# Patient Record
Sex: FEMALE | Race: WHITE | NOT HISPANIC OR LATINO
[De-identification: names, ages, dates, MRNs, and addresses within clinical notes are randomized per-mention and may not be internally consistent; named-entity substitution may affect disease eponyms.]

---

## 2023-03-08 PROBLEM — Z00.00 ENCOUNTER FOR PREVENTIVE HEALTH EXAMINATION: Status: ACTIVE | Noted: 2023-03-08

## 2023-03-22 ENCOUNTER — NON-APPOINTMENT (OUTPATIENT)
Age: 53
End: 2023-03-22

## 2023-03-22 ENCOUNTER — APPOINTMENT (OUTPATIENT)
Dept: BREAST CENTER | Facility: CLINIC | Age: 53
End: 2023-03-22
Payer: COMMERCIAL

## 2023-03-22 VITALS
SYSTOLIC BLOOD PRESSURE: 113 MMHG | HEIGHT: 61 IN | DIASTOLIC BLOOD PRESSURE: 69 MMHG | HEART RATE: 81 BPM | WEIGHT: 120 LBS | BODY MASS INDEX: 22.66 KG/M2

## 2023-03-22 DIAGNOSIS — Z78.9 OTHER SPECIFIED HEALTH STATUS: ICD-10-CM

## 2023-03-22 DIAGNOSIS — Z80.1 FAMILY HISTORY OF MALIGNANT NEOPLASM OF TRACHEA, BRONCHUS AND LUNG: ICD-10-CM

## 2023-03-22 DIAGNOSIS — J45.909 UNSPECIFIED ASTHMA, UNCOMPLICATED: ICD-10-CM

## 2023-03-22 PROCEDURE — 99203 OFFICE O/P NEW LOW 30 MIN: CPT

## 2023-03-24 PROBLEM — Z78.9 NO FAMILY HISTORY OF OVARIAN CANCER: Status: ACTIVE | Noted: 2023-03-22

## 2023-03-24 PROBLEM — Z78.9 NO FAMILY HISTORY OF CANCER: Status: ACTIVE | Noted: 2023-03-22

## 2023-03-24 NOTE — HISTORY OF PRESENT ILLNESS
[FreeTextEntry1] : This is a 52 year old female referred by Dr. Raygoza for recent diagnosis of PASH. Patient underwent a USGBx of the left breast at [facility] on 2/15/23. Biopsy of the 4:00 nodule (8mm)  showed fibroadenomatous change and dense stromal fibrosis with focal PASH. Biopsy was felt to be concordant. \par Patient reports biopsies in the same area (left lower breast) at [facility] in 2010 and 2011 pathology was benign.\par \par She has no breast complaints today.\par \par She doesn't usually perform SBE.\par She has not noticed a change in her breast or a breast lump. except post bx changes\par She has not noticed a change in her nipple or nipple area.\par She has not noticed a change in the skin of the breast.\par She is not experiencing nipple discharge.\par She is not experiencing breast pain.\par She has not noticed a lump or lymph node under the armpit.\par \par \par BREAST CANCER RISK FACTORS\par Menarche: 14 \par Date of LMP: 2/18/2023\par Menopause: pre \par Grav:   4   Para: 3 (16, 14, 10)\par Age at first live birth: 2004\par Nursed: Y \par Hysterectomy: N \par Oophorectomy: N \par OCP: Y about 10 years \par HRT: N \par Last pap/pelvic exam: 2020 WNL \par Related family history: none\par Ashkenazi: N\par Mastery risk assessment: BRCAPRO 12.6% TCv7 24.4% TCv8 23.3% Sofia 17.7% Edvin n/a\par BRCA testing: N\par Bra size: 36B\par \par Last mammogram:  2/9/23                            Location: [facility]\par Report reviewed.                                 Images reviewed.\par Results: \par No dominant mass, suspicious microcalcifications or architectural distortion identified.\par \par Last ultrasound:   2/9/23                                Location:\par Report reviewed.                                 Images reviewed. \par Results: BIRADS 4\par Left breast: Developing nodule at 4:00, USGBx and post procedure MG recommended.\par Right breast: Stable with no suspicious findings. Biopsy should be considered.\par \par Last MRI: never                                            Location:\par Report reviewed.\par

## 2023-03-24 NOTE — ASSESSMENT
[FreeTextEntry1] : 51 yo female with high risk\par reviewed her risk status\par We reviewed risk reduction strategies including maintaining a BMI <25, limiting red meat intake and alcoholic beverages to 3 per week and exercise (150 min/ week low intensity or 75 min/week high intensity). And maintaining a normal vitamin D level.\par reviewed pathology, copy given\par I do not recommend excision at this time\par plan left 4N4 follow up ultrasound AUG 2023\par discussed MRI, she is amenable to this, reviewed risk of false positives\par plan MRI asap\par plan mg/sono 2/2024\par f/u 6 months if imaging is negative\par She knows to call or return sooner should any concerns or questions arise.\par \par

## 2023-03-24 NOTE — PHYSICAL EXAM
[Normocephalic] : normocephalic [Atraumatic] : atraumatic [Supple] : supple [No Supraclavicular Adenopathy] : no supraclavicular adenopathy [Examined in the supine and seated position] : examined in the supine and seated position [Symmetrical] : symmetrical [No dominant masses] : no dominant masses in right breast  [No dominant masses] : no dominant masses left breast [No Nipple Retraction] : no left nipple retraction [No Nipple Discharge] : no left nipple discharge [No Axillary Lymphadenopathy] : no left axillary lymphadenopathy [No Edema] : no edema [No Rashes] : no rashes [No Ulceration] : no ulceration [de-identified] : defect in LOQ in area of 3 biopsies

## 2023-03-24 NOTE — CONSULT LETTER
[Dear  ___] : Dear  [unfilled], [( Thank you for referring [unfilled] for consultation for _____ )] : Thank you for referring [unfilled] for consultation for [unfilled] [Please see my note below.] : Please see my note below. [Consult Closing:] : Thank you very much for allowing me to participate in the care of this patient.  If you have any questions, please do not hesitate to contact me. [Sincerely,] : Sincerely, [DrRobby  ___] : Dr. YOUSSEF [FreeTextEntry3] : Dolores Rivers MS DO\par Breast Surgeon\par [facility] \[place] [address]\par

## 2023-04-04 ENCOUNTER — NON-APPOINTMENT (OUTPATIENT)
Age: 53
End: 2023-04-04

## 2023-05-17 DIAGNOSIS — F41.9 ANXIETY DISORDER, UNSPECIFIED: ICD-10-CM

## 2023-09-18 ENCOUNTER — APPOINTMENT (OUTPATIENT)
Dept: BREAST CENTER | Facility: CLINIC | Age: 53
End: 2023-09-18
Payer: COMMERCIAL

## 2023-09-18 VITALS
HEART RATE: 58 BPM | HEIGHT: 61 IN | SYSTOLIC BLOOD PRESSURE: 116 MMHG | WEIGHT: 120 LBS | BODY MASS INDEX: 22.66 KG/M2 | DIASTOLIC BLOOD PRESSURE: 67 MMHG

## 2023-09-18 DIAGNOSIS — Z12.31 ENCOUNTER FOR SCREENING MAMMOGRAM FOR MALIGNANT NEOPLASM OF BREAST: ICD-10-CM

## 2023-09-18 DIAGNOSIS — N64.89 OTHER SPECIFIED DISORDERS OF BREAST: ICD-10-CM

## 2023-09-18 DIAGNOSIS — Z91.89 OTHER SPECIFIED PERSONAL RISK FACTORS, NOT ELSEWHERE CLASSIFIED: ICD-10-CM

## 2023-09-18 DIAGNOSIS — R92.2 INCONCLUSIVE MAMMOGRAM: ICD-10-CM

## 2023-09-18 PROCEDURE — 99214 OFFICE O/P EST MOD 30 MIN: CPT

## 2023-09-18 RX ORDER — ALPRAZOLAM 0.5 MG/1
0.5 TABLET ORAL
Qty: 2 | Refills: 0 | Status: DISCONTINUED | COMMUNITY
Start: 2023-05-17 | End: 2023-09-18

## 2024-07-31 ENCOUNTER — NON-APPOINTMENT (OUTPATIENT)
Age: 54
End: 2024-07-31

## 2024-09-23 ENCOUNTER — APPOINTMENT (OUTPATIENT)
Dept: BREAST CENTER | Facility: CLINIC | Age: 54
End: 2024-09-23
Payer: COMMERCIAL

## 2024-09-23 VITALS
BODY MASS INDEX: 24.55 KG/M2 | SYSTOLIC BLOOD PRESSURE: 104 MMHG | HEIGHT: 61 IN | DIASTOLIC BLOOD PRESSURE: 64 MMHG | WEIGHT: 130 LBS | HEART RATE: 58 BPM

## 2024-09-23 DIAGNOSIS — Z91.89 OTHER SPECIFIED PERSONAL RISK FACTORS, NOT ELSEWHERE CLASSIFIED: ICD-10-CM

## 2024-09-23 DIAGNOSIS — R92.2 INCONCLUSIVE MAMMOGRAM: ICD-10-CM

## 2024-09-23 DIAGNOSIS — F41.9 ANXIETY DISORDER, UNSPECIFIED: ICD-10-CM

## 2024-09-23 DIAGNOSIS — R92.30 INCONCLUSIVE MAMMOGRAM: ICD-10-CM

## 2024-09-23 DIAGNOSIS — R92.30 DENSE BREASTS, UNSPECIFIED: ICD-10-CM

## 2024-09-23 DIAGNOSIS — N64.89 OTHER SPECIFIED DISORDERS OF BREAST: ICD-10-CM

## 2024-09-23 DIAGNOSIS — Z12.31 ENCOUNTER FOR SCREENING MAMMOGRAM FOR MALIGNANT NEOPLASM OF BREAST: ICD-10-CM

## 2024-09-23 PROCEDURE — 99214 OFFICE O/P EST MOD 30 MIN: CPT

## 2024-09-23 NOTE — HISTORY OF PRESENT ILLNESS
[FreeTextEntry1] : This is a 54 year old female referred by Dr. Raygoza for recent diagnosis of PASH. Patient underwent a USGBx of the left breast at [facility] on 2/15/23. Biopsy of the 4:00 nodule (8mm)  showed fibroadenomatous change and dense stromal fibrosis with focal PASH. Biopsy was felt to be concordant.  Patient reports biopsies in the same area (left lower breast) at [facility] in 2010 and 2011 pathology was benign.  She has no breast complaints today.  She doesn't usually perform SBE. She has not noticed a change in her breast or a breast lump.  She has not noticed a change in her nipple or nipple area. She has not noticed a change in the skin of the breast. She is not experiencing nipple discharge. She is not experiencing breast pain. She has not noticed a lump or lymph node under the armpit.   BREAST CANCER RISK FACTORS Menarche: 14  Date of LMP: 09/13/2024 Menopause: pre  Grav:   4   Para: 3 (16, 14, 10) Age at first live birth: 2004 Nursed: Y  Hysterectomy: N  Oophorectomy: N  OCP: Y about 10 years  HRT: N  Last pap/pelvic exam: 02/2024 WNL  Related family history: none Ashkenazi: N Mastery risk assessment: BRCAPRO 12.6% TCv7 24.4% TCv8 23.3% Sofia 17.7% Edvin n/a BRCA testing: N Bra size: 36B  Last mammogram:  04/15/2024                  Location: [facility] Report reviewed.                                 Images reviewed. Results: BI-RADS 2 NO evidence of malignancy  Last ultrasound:  04/15/2024         Location: [facility]  Report reviewed.                                 Images reviewed.  Results: BIRADS 2 stable examination with no evidence of malignancy. Stable appearing hypoechoic lesion at the 4:00 axis left breast 4 cm nipple, previously biopsied with benign results   Last MRI: 05/22/2023                                         Location: [facility]  Report reviewed. Results: BI-RADS 2  Fibrocystic changes. No suspicious findings.

## 2024-09-23 NOTE — ASSESSMENT
[FreeTextEntry1] : 53 yo female with high risk reviewed her risk status We reviewed risk reduction strategies including maintaining a BMI <25, limiting red meat intake and alcoholic beverages to 3 per week and exercise (150 min/ week low intensity or 75 min/week high intensity). And maintaining a normal vitamin D level.  discussed MRI, she is amenable to this, reviewed risk of false positives plan MRI 10/2024 plan mg/sono 4/2025 f/u 1 year if imaging is negative She knows to call or return sooner should any concerns or questions arise.

## 2024-09-23 NOTE — ASSESSMENT
[FreeTextEntry1] : 55 yo female with high risk reviewed her risk status We reviewed risk reduction strategies including maintaining a BMI <25, limiting red meat intake and alcoholic beverages to 3 per week and exercise (150 min/ week low intensity or 75 min/week high intensity). And maintaining a normal vitamin D level.  discussed MRI, she is amenable to this, reviewed risk of false positives plan MRI 10/2024 plan mg/sono 4/2025 f/u 1 year if imaging is negative She knows to call or return sooner should any concerns or questions arise.

## 2024-09-23 NOTE — CONSULT LETTER
[Dear  ___] : Dear  [unfilled], [Courtesy Letter:] : I had the pleasure of seeing your patient, [unfilled], in my office today. [Please see my note below.] : Please see my note below. [Consult Closing:] : Thank you very much for allowing me to participate in the care of this patient.  If you have any questions, please do not hesitate to contact me. [Sincerely,] : Sincerely, [DrRobby  ___] : Dr. YOUSSEF [FreeTextEntry3] : Dolores Rivers MS DO\par  Breast Surgeon\par  [facility] \[place] [address]\par

## 2024-09-23 NOTE — CONSULT LETTER
[Dear  ___] : Dear  [unfilled], [Courtesy Letter:] : I had the pleasure of seeing your patient, [unfilled], in my office today. [Please see my note below.] : Please see my note below. [Consult Closing:] : Thank you very much for allowing me to participate in the care of this patient.  If you have any questions, please do not hesitate to contact me. [Sincerely,] : Sincerely, [DrRobby  ___] : Dr. YOUSSEF [FreeTextEntry3] : Dolores Rivers MS DO\par  Breast Surgeon\par  [facility] \[place] [address]\par

## 2024-09-23 NOTE — HISTORY OF PRESENT ILLNESS
[FreeTextEntry1] : This is a 54 year old female referred by Dr. Raygoza for recent diagnosis of PASH. Patient underwent a USGBx of the left breast at [facility] on 2/15/23. Biopsy of the 4:00 nodule (8mm)  showed fibroadenomatous change and dense stromal fibrosis with focal PASH. Biopsy was felt to be concordant.  Patient reports biopsies in the same area (left lower breast) at [facility] in 2010 and 2011 pathology was benign.  She has no breast complaints today.  She doesn't usually perform SBE. She has not noticed a change in her breast or a breast lump.  She has not noticed a change in her nipple or nipple area. She has not noticed a change in the skin of the breast. She is not experiencing nipple discharge. She is not experiencing breast pain. She has not noticed a lump or lymph node under the armpit.   BREAST CANCER RISK FACTORS Menarche: 14  Date of LMP: 09/13/2024 Menopause: pre  Grav:   4   Para: 3 (16, 14, 10) Age at first live birth: 2004 Nursed: Y  Hysterectomy: N  Oophorectomy: N  OCP: Y about 10 years  HRT: N  Last pap/pelvic exam: 02/2024 WNL  Related family history: none Ashkenazi: N Mastery risk assessment: BRCAPRO 12.6% TCv7 24.4% TCv8 23.3% Sofia 17.7% Edvin n/a BRCA testing: N Bra size: 36B  Last mammogram:  04/15/2024                  Location: [facility] Report reviewed.                                 Images reviewed. Results: BI-RADS 2 NO evidence of malignancy  Last ultrasound:  04/15/2024         Location: [facility]  Report reviewed.                                 Images reviewed.  Results: BIRADS 2 stable examination with no evidence of malignancy. Stable appearing hypoechoic lesion at the 4:00 axis left breast 4 cm nipple, previously biopsied with benign results   Last MRI: 05/22/2023                                         Location: [facility]  Report reviewed. Results: BI-RADS 2  Fibrocystic changes. No suspicious findings.

## 2024-09-23 NOTE — PHYSICAL EXAM
[Normocephalic] : normocephalic [Atraumatic] : atraumatic [Supple] : supple [No Supraclavicular Adenopathy] : no supraclavicular adenopathy [Examined in the supine and seated position] : examined in the supine and seated position [Symmetrical] : symmetrical [No dominant masses] : no dominant masses in right breast  [No dominant masses] : no dominant masses left breast [No Nipple Retraction] : no left nipple retraction [No Nipple Discharge] : no left nipple discharge [No Axillary Lymphadenopathy] : no left axillary lymphadenopathy [No Edema] : no edema [No Rashes] : no rashes [No Ulceration] : no ulceration [de-identified] : defect in LOQ in area of 3 biopsies

## 2024-09-23 NOTE — PHYSICAL EXAM
[Normocephalic] : normocephalic [Atraumatic] : atraumatic [Supple] : supple [No Supraclavicular Adenopathy] : no supraclavicular adenopathy [Examined in the supine and seated position] : examined in the supine and seated position [Symmetrical] : symmetrical [No dominant masses] : no dominant masses in right breast  [No dominant masses] : no dominant masses left breast [No Nipple Retraction] : no left nipple retraction [No Nipple Discharge] : no left nipple discharge [No Axillary Lymphadenopathy] : no left axillary lymphadenopathy [No Edema] : no edema [No Rashes] : no rashes [No Ulceration] : no ulceration [de-identified] : defect in LOQ in area of 3 biopsies